# Patient Record
Sex: MALE | Race: WHITE | NOT HISPANIC OR LATINO
[De-identification: names, ages, dates, MRNs, and addresses within clinical notes are randomized per-mention and may not be internally consistent; named-entity substitution may affect disease eponyms.]

---

## 2020-09-16 ENCOUNTER — TRANSCRIPTION ENCOUNTER (OUTPATIENT)
Age: 50
End: 2020-09-16

## 2024-06-11 ENCOUNTER — EMERGENCY (EMERGENCY)
Facility: HOSPITAL | Age: 54
LOS: 1 days | Discharge: HOME | End: 2024-06-11
Attending: STUDENT IN AN ORGANIZED HEALTH CARE EDUCATION/TRAINING PROGRAM
Payer: MEDICARE

## 2024-06-11 VITALS
HEART RATE: 82 BPM | OXYGEN SATURATION: 100 % | HEIGHT: 71 IN | WEIGHT: 240.08 LBS | RESPIRATION RATE: 20 BRPM | TEMPERATURE: 98 F | DIASTOLIC BLOOD PRESSURE: 94 MMHG | SYSTOLIC BLOOD PRESSURE: 147 MMHG

## 2024-06-11 VITALS
HEART RATE: 82 BPM | DIASTOLIC BLOOD PRESSURE: 99 MMHG | RESPIRATION RATE: 18 BRPM | SYSTOLIC BLOOD PRESSURE: 162 MMHG | OXYGEN SATURATION: 96 % | TEMPERATURE: 99 F

## 2024-06-11 PROCEDURE — 82962 GLUCOSE BLOOD TEST: CPT

## 2024-06-11 PROCEDURE — 99283 EMERGENCY DEPT VISIT LOW MDM: CPT

## 2024-06-11 PROCEDURE — 99282 EMERGENCY DEPT VISIT SF MDM: CPT

## 2024-06-11 NOTE — ED PROVIDER NOTE - OBJECTIVE STATEMENT
54M with no known pmhx presents to the ED for bilateral LE discoloration noticed this AM. On arrival to the ED patient notes that they discoloration was due to the dye in his jeans. Also reports burning pain in his bilateral feet for the past few weeks. Ambulates 3-5 miles per day. Has not seen a doctor in many years. No leg swelling or edema, no LE weakness. No chest pain, sob, nausea, vomiting.

## 2024-06-11 NOTE — ED PROVIDER NOTE - IV ALTEPLASE EXCL ABS HIDDEN
LDL is better but still high  Low fat diet  Has he been compliant with atorvastatin 40 mg daily?  I do not see a prescription sent since March 2021  Please call and confirm  If not compliant and needs prescription please send ASAP  Thanks show

## 2024-06-11 NOTE — ED PROVIDER NOTE - PATIENT PORTAL LINK FT
You can access the FollowMyHealth Patient Portal offered by [facility] by registering at the following website: [email or web address]. By joining [facility]’s FollowMyHealth portal, you will also be able to view your health information using other applications (apps) compatible with our system.

## 2024-06-11 NOTE — ED ADULT NURSE NOTE - NSFALLUNIVINTERV_ED_ALL_ED
Bed/Stretcher in lowest position, wheels locked, appropriate side rails in place/Call bell, personal items and telephone in reach/Instruct patient to call for assistance before getting out of bed/chair/stretcher/Non-slip footwear applied when patient is off stretcher/[place] to call system/Physically safe environment - no spills, clutter or unnecessary equipment/Purposeful proactive rounding/Room/bathroom lighting operational, light cord in reach

## 2024-06-11 NOTE — ED ADULT NURSE NOTE - OBJECTIVE STATEMENT
55yo M A&O4, denies significant pmhx, presents to ED from home with c/o b/l lower extremity pain and discoloration. patient reports several weeks of burning pain to skin of his b/l feet felt when walking, states he walks multiple miles a day and this is when the pain is felt. pt reports he has not seen a primary doctor in 10 years due to anxiety so he has avoided seeing a doctor for these symptoms. pt however believes it may be related to undiagnosed diabetes as he intentionally lost 120 pounds in previous years but now has recently been eating more junk food at nighttime. pt reports he then noticed a blue discoloration to his b/l lower extremities this morning and got nervous prompting him to come to the ED. upon eval in ED, pt states he now realized this discoloration is dye from a new pair of jeans and is rubbing off. pt denies any numbness, increased thirst, increased urination, chest pain, shortness of breath, abdominal pain, n/v/d, recent falls, recent travel, or history of blood clots. on eval pt a&ox4, ambulatory independent with steady gait, PÉREZ with 5/5 strength x4, sensation intact b/l, 2+ pedal pulses b/l, skin warm and dry b/l. + blue coloring noted to b/l lower extremities that is easily removable when pt rubs the skin. MD at bedside for evaluation. pt expresses that he does not want to have testing done in the ED and would rather follow up with a primary care doctor outpatient. MD at bedside to discuss this situation with pt. 53yo M A&O4, denies significant pmhx, presents to ED from home with c/o b/l lower extremity pain and discoloration. patient reports several weeks of burning pain to skin of his b/l feet felt when walking, states he walks multiple miles a day and this is when the pain is felt. pt reports he has not seen a primary doctor in 10 years due to anxiety so he has avoided seeing a doctor for these symptoms. pt however believes it may be related to undiagnosed diabetes as he intentionally lost 120 pounds in previous years but now has recently been eating more junk food at nighttime. pt reports he then noticed a blue discoloration to his b/l lower extremities this morning and got nervous prompting him to come to the ED. upon eval in ED, pt states he now realized this discoloration is dye from a new pair of jeans and is rubbing off. pt denies any numbness, increased thirst, increased urination, chest pain, shortness of breath, abdominal pain, n/v/d, recent falls, recent travel, or history of blood clots. on eval pt a&ox4, ambulatory independent with steady gait, PÉREZ with 5/5 strength x4, sensation intact b/l, 2+ pedal pulses b/l, skin warm and dry b/l. + blue coloring noted to b/l lower extremities that is easily removable when pt rubs the skin. MD at bedside for evaluation. pt expresses that he does not want to have testing done in the ED and would rather follow up with a primary care doctor outpatient. MD at bedside to discuss options with pt.

## 2024-06-11 NOTE — ED PROVIDER NOTE - CLINICAL SUMMARY MEDICAL DECISION MAKING FREE TEXT BOX
54M with no known pmhx presents for a few weeks of burning sensation in bilateral LE with discoloration that he noticed today. While undressing in ED patient noticed the discoloration was due to dye in jeans. No LE edema. No DVT risk factors. Bilateral LE n/v intact. patient has not seen physician in many years. States he only came in because of the discoloration and now does not feel his needs a workup. Will obtain FS and discharge home. Advised patient to follow up with primary care doctor for further w/u of symptoms.

## 2024-06-11 NOTE — ED PROVIDER NOTE - NSFOLLOWUPINSTRUCTIONS_ED_ALL_ED_FT
PLEASE FOLLOW UP WITH YOUR PRIMARY CARE DOCTOR. CALL TODAY TO SCHEDULE AN APPOINTMENT. RETURN TO THE EMERGENCY DEPARTMENT IF YOU ARE UNABLE TO WALK, FEEL OFF BALANCED, HAVE WORSENING PAIN, INCREASED SWELLING OF THE LEGS, OR ANY OTHER CONCERNS.

## 2024-06-11 NOTE — ED PROVIDER NOTE - PHYSICAL EXAMINATION
GEN: NAD, awake, eyes open spontaneously  EYES: normal conjunctiva, perrl  ENT: NCAT, MMM, Trachea midline  CHEST/LUNGS: Non-tachypneic, CTAB, bilateral breath sounds  CARDIAC: Non-tachycardic, normal perfusion  ABDOMEN: Soft, NTND, No rebound/guarding  MSK: No edema, no calf tenderness, no plantar tenderness. Blue discoloration of bilateral LE, sporadic and able to be wiped off. No bruising or erythema. 2+ DP pulses bilaterally.   SKIN: No rashes, no petechiae, no vesicles  NEURO: Ambulatory. normal and symmetric strength and sensation.